# Patient Record
Sex: FEMALE | Race: WHITE
[De-identification: names, ages, dates, MRNs, and addresses within clinical notes are randomized per-mention and may not be internally consistent; named-entity substitution may affect disease eponyms.]

---

## 2018-02-04 ENCOUNTER — HOSPITAL ENCOUNTER (EMERGENCY)
Dept: HOSPITAL 45 - C.EDB | Age: 23
Discharge: HOME | End: 2018-02-04
Payer: COMMERCIAL

## 2018-02-04 VITALS
DIASTOLIC BLOOD PRESSURE: 67 MMHG | TEMPERATURE: 98.6 F | HEART RATE: 97 BPM | OXYGEN SATURATION: 97 % | SYSTOLIC BLOOD PRESSURE: 124 MMHG

## 2018-02-04 VITALS
BODY MASS INDEX: 34.1 KG/M2 | HEIGHT: 64.02 IN | WEIGHT: 199.74 LBS | HEIGHT: 64.02 IN | WEIGHT: 199.74 LBS | BODY MASS INDEX: 34.1 KG/M2

## 2018-02-04 DIAGNOSIS — Z79.3: ICD-10-CM

## 2018-02-04 DIAGNOSIS — J10.1: Primary | ICD-10-CM

## 2018-02-04 LAB
ALBUMIN SERPL-MCNC: 3.7 GM/DL (ref 3.4–5)
ALP SERPL-CCNC: 70 U/L (ref 45–117)
ALT SERPL-CCNC: 20 U/L (ref 12–78)
AST SERPL-CCNC: 15 U/L (ref 15–37)
BASOPHILS # BLD: 0.01 K/UL (ref 0–0.2)
BASOPHILS NFR BLD: 0.2 %
BUN SERPL-MCNC: 7 MG/DL (ref 7–18)
CALCIUM SERPL-MCNC: 8.7 MG/DL (ref 8.5–10.1)
CO2 SERPL-SCNC: 26 MMOL/L (ref 21–32)
CREAT SERPL-MCNC: 0.83 MG/DL (ref 0.6–1.2)
EOS ABS #: 0 K/UL (ref 0–0.5)
EOSINOPHIL NFR BLD AUTO: 223 K/UL (ref 130–400)
GLUCOSE SERPL-MCNC: 114 MG/DL (ref 70–99)
HCT VFR BLD CALC: 43.4 % (ref 37–47)
HGB BLD-MCNC: 14.5 G/DL (ref 12–16)
IG#: 0.01 K/UL (ref 0–0.02)
IMM GRANULOCYTES NFR BLD AUTO: 14 %
INFLUENZA B ANTIGEN: (no result)
LYMPHOCYTES # BLD: 0.75 K/UL (ref 1.2–3.4)
MCH RBC QN AUTO: 28.9 PG (ref 25–34)
MCHC RBC AUTO-ENTMCNC: 33.4 G/DL (ref 32–36)
MCV RBC AUTO: 86.6 FL (ref 80–100)
MONO ABS #: 0.73 K/UL (ref 0.11–0.59)
MONOCYTES NFR BLD: 13.6 %
NEUT ABS #: 3.85 K/UL (ref 1.4–6.5)
NEUTROPHILS # BLD AUTO: 0 %
NEUTROPHILS NFR BLD AUTO: 72 %
PMV BLD AUTO: 9.5 FL (ref 7.4–10.4)
POTASSIUM SERPL-SCNC: 3.4 MMOL/L (ref 3.5–5.1)
PROT SERPL-MCNC: 7.9 GM/DL (ref 6.4–8.2)
RED CELL DISTRIBUTION WIDTH CV: 13.1 % (ref 11.5–14.5)
RED CELL DISTRIBUTION WIDTH SD: 41.7 FL (ref 36.4–46.3)
SODIUM SERPL-SCNC: 136 MMOL/L (ref 136–145)
WBC # BLD AUTO: 5.35 K/UL (ref 4.8–10.8)

## 2018-02-04 NOTE — DIAGNOSTIC IMAGING REPORT
CHEST 2 VIEWS ROUTINE



CLINICAL HISTORY: 22 years-old Female presenting with cough, fever, eval PNA. 



TECHNIQUE: PA and lateral views of the chest were obtained.



COMPARISON: None.



FINDINGS:

Cardiomediastinal silhouette normal. Lungs and pleural spaces clear. Osseous

structures normal. Upper abdomen normal.



IMPRESSION:

1.  No acute cardiopulmonary disease.







Electronically signed by:  Cameron Leyva M.D.

2/4/2018 5:23 PM



Dictated Date/Time:  2/4/2018 5:22 PM

## 2018-02-04 NOTE — EMERGENCY ROOM VISIT NOTE
History


First contact with patient:  15:51


Chief Complaint:  FLU LIKE SX


Stated Complaint:  CHEST PAIN,COUGH,HA,COLD,FEVER,RUNNY NOSE,DIZZY





History of Present Illness


The patient is a 22 year old female who presents to the Emergency Room with 

complaints of cough for the past week, and development of worsening cough, 

chest tightness, fevers and chills, dizziness, and fatigue starting last night.

  She has been coughing up clear mucus, she denies hemoptysis.  She states her 

chest tightness is in the center of her chest, worse with coughing, better with 

rest, not worse with taking a deep breath or lying flat, 2/10.  She complains 

of generalized body aches and headaches as well.  She denies shortness of breath

, but states that she feels a little winded with activity, which is unusual for 

her.  She states she took Motrin last night, but has not taken any medications 

today for her symptoms.  She denies any neck pain or stiffness, vision changes, 

syncope, abdominal pain, back pain, nausea or vomiting, diarrhea, constipation, 

bloody or black stools, dysuria or urinary frequency, abnormal vaginal discharge

, or rash.  She states that her period started yesterday and these are regular, 

she denies concern for pregnancy.





Review of Systems


A complete 10 point review of systems was reviewed with the patient with 

pertinent positives and negatives as per history of present illness. All else 

were negative.





Past Medical/Surgical History


No significant past medical or surgical history.  She is up-to-date on 

immunizations.





Social History


Smoking Status:  Never Smoker


Alcohol Use:  none


Drug Use:  none


Marital Status:  single


Occupation Status:  [employer] student





Current/Historical Medications


Scheduled


Birth Control Pills (Birth Control Pills), 1 TAB PO DAILY





Allergies


No known allergies





Physical Exam


Vital Signs











  Date Time  Temp Pulse Resp B/P (MAP) Pulse Ox O2 Delivery O2 Flow Rate FiO2


 


2/4/18 19:10 37.0 97 18 124/67 97   


 


2/4/18 18:10 37.3 98 15 110/61 97 Room Air  


 


2/4/18 17:35  100 15  97   


 


2/4/18 17:05  107 20  97   


 


2/4/18 16:48  102      


 


2/4/18 16:39    118/52    


 


2/4/18 16:38  95 18 118/52 97 Room Air  


 


2/4/18 15:09 38.9 119 18 112/70 97 Room Air  











Physical Exam


CONSTITUTIONAL: Pleasant and cooperative.  No acute distress, but does appear 

uncomfortable.  Mildly dehydrated.


HEENT: Normocephalic, atraumatic. Pupils equal, round and reactive to light, 

EOMI. TMs normal. Pharynx normal. []


NECK: Supple, full active range of motion without discomfort.


RESPIRATORY: Clear to auscultation bilaterally with no wheezing, crackles, 

rhonchi or stridor. Equal expansion bilaterally.


CARDIOVASCULAR: Regular rate and rhythm with no murmurs, rubs or gallops. 

Normal peripheral perfusion. No edema.


GASTROINTESTINAL: Soft, nontender, nondistended. No palpable masses or HSM. 

Bowel sounds present in all quadrants. 


MUSCULOSKELETAL: Full range of motion of all joints without discomfort.


INTEGUMENTARY: No rash or other significant dermatologic conditions noted.


NEUROLOGIC: Alert and oriented X 4 with normal affect.  Cranial nerves II-XII 

grossly intact. No focal neurologic deficits noted. []





Medical Decision & Procedures


ER Provider


Diagnostic Interpretation:


CHEST 2 VIEWS ROUTINE





CLINICAL HISTORY: 22 years-old Female presenting with cough, fever, eval PNA. 





TECHNIQUE: PA and lateral views of the chest were obtained.





COMPARISON: None.





FINDINGS:


Cardiomediastinal silhouette normal. Lungs and pleural spaces clear. Osseous


structures normal. Upper abdomen normal.





IMPRESSION:


1.  No acute cardiopulmonary disease.





Laboratory Results


2/4/18 16:21








Red Blood Count 5.01, Mean Corpuscular Volume 86.6, Mean Corpuscular Hemoglobin 

28.9, Mean Corpuscular Hemoglobin Concent 33.4, Mean Platelet Volume 9.5, 

Neutrophils (%) (Auto) 72.0, Lymphocytes (%) (Auto) 14.0, Monocytes (%) (Auto) 

13.6, Eosinophils (%) (Auto) 0.0, Basophils (%) (Auto) 0.2, Neutrophils # (Auto

) 3.85, Lymphocytes # (Auto) 0.75, Monocytes # (Auto) 0.73, Eosinophils # (Auto

) 0.00, Basophils # (Auto) 0.01





2/4/18 16:21

















Test


  2/4/18


16:21 2/4/18


16:40


 


White Blood Count


  5.35 K/uL


(4.8-10.8) 


 


 


Red Blood Count


  5.01 M/uL


(4.2-5.4) 


 


 


Hemoglobin


  14.5 g/dL


(12.0-16.0) 


 


 


Hematocrit 43.4 % (37-47)  


 


Mean Corpuscular Volume


  86.6 fL


() 


 


 


Mean Corpuscular Hemoglobin


  28.9 pg


(25-34) 


 


 


Mean Corpuscular Hemoglobin


Concent 33.4 g/dl


(32-36) 


 


 


Platelet Count


  223 K/uL


(130-400) 


 


 


Mean Platelet Volume


  9.5 fL


(7.4-10.4) 


 


 


Neutrophils (%) (Auto) 72.0 %  


 


Lymphocytes (%) (Auto) 14.0 %  


 


Monocytes (%) (Auto) 13.6 %  


 


Eosinophils (%) (Auto) 0.0 %  


 


Basophils (%) (Auto) 0.2 %  


 


Neutrophils # (Auto)


  3.85 K/uL


(1.4-6.5) 


 


 


Lymphocytes # (Auto)


  0.75 K/uL


(1.2-3.4) 


 


 


Monocytes # (Auto)


  0.73 K/uL


(0.11-0.59) 


 


 


Eosinophils # (Auto)


  0.00 K/uL


(0-0.5) 


 


 


Basophils # (Auto)


  0.01 K/uL


(0-0.2) 


 


 


RDW Standard Deviation


  41.7 fL


(36.4-46.3) 


 


 


RDW Coefficient of Variation


  13.1 %


(11.5-14.5) 


 


 


Immature Granulocyte % (Auto) 0.2 %  


 


Immature Granulocyte # (Auto)


  0.01 K/uL


(0.00-0.02) 


 


 


Anion Gap


  9.0 mmol/L


(3-11) 


 


 


Est Creatinine Clear Calc


Drug Dose 117.9 ml/min 


  


 


 


Estimated GFR (


American) 117.7 


  


 


 


Estimated GFR (Non-


American 101.5 


  


 


 


BUN/Creatinine Ratio 8.3 (10-20)  


 


Calcium Level


  8.7 mg/dl


(8.5-10.1) 


 


 


Total Bilirubin


  0.3 mg/dl


(0.2-1) 


 


 


Aspartate Amino Transf


(AST/SGOT) 15 U/L (15-37) 


  


 


 


Alanine Aminotransferase


(ALT/SGPT) 20 U/L (12-78) 


  


 


 


Alkaline Phosphatase


  70 U/L


() 


 


 


Total Protein


  7.9 gm/dl


(6.4-8.2) 


 


 


Albumin


  3.7 gm/dl


(3.4-5.0) 


 


 


Globulin


  4.2 gm/dl


(2.5-4.0) 


 


 


Albumin/Globulin Ratio 0.9 (0.9-2)  


 


Influenza Type A Antigen


  Neg for Influ


A (NEG) 


 


 


Influenza Type B Antigen


  POS for Influ


B (NEG) 


 


 


Urine Color  DK YELLOW 


 


Urine Appearance  CLEAR (CLEAR) 


 


Urine pH  5.0 (4.5-7.5) 


 


Urine Specific Gravity


  


  1.027


(1.000-1.030)


 


Urine Protein  NEG (NEG) 


 


Urine Glucose (UA)  NEG (NEG) 


 


Urine Ketones  TRACE (NEG) 


 


Urine Occult Blood  2+ (NEG) 


 


Urine Nitrite  NEG (NEG) 


 


Urine Bilirubin  NEG (NEG) 


 


Urine Urobilinogen  NEG (NEG) 


 


Urine Leukocyte Esterase  NEG (NEG) 


 


Urine WBC (Auto)


  


  5-10 /hpf


(0-5)


 


Urine RBC (Auto)  0-4 /hpf (0-4) 


 


Urine Hyaline Casts (Auto)  1-5 /lpf (0-5) 


 


Urine Epithelial Cells (Auto)  >30 /lpf (0-5) 


 


Urine Bacteria (Auto)  NEG (NEG) 


 


Urine Pregnancy Test  NEG (NEG) 











Medications Administered











 Medications


  (Trade)  Dose


 Ordered  Sig/Corey


 Route  Start Time


 Stop Time Status Last Admin


Dose Admin


 


 Acetaminophen


  (Tylenol Tab)  1,000 mg  NOW  STAT


 PO  2/4/18 15:16


 2/4/18 15:18 DC 2/4/18 15:55


1,000 MG


 


 Sodium Chloride  1,000 ml @ 


 999 mls/hr  Q1H1M STAT


 IV  2/4/18 16:03


 2/4/18 17:03 DC 2/4/18 16:26


999 MLS/HR


 


 Albuterol/


 Ipratropium


  (Duoneb)  3 ml  NOW  STAT


 INH  2/4/18 16:11


 2/4/18 16:12 DC 2/4/18 16:26


3 ML


 


 Ketorolac


 Tromethamine


  (Toradol Inj)  15 mg  NOW  STAT


 IV  2/4/18 17:29


 2/4/18 17:30 DC 2/4/18 18:09


15 MG











ECG


Indication:  chest pain, tachycardia


Rate (beats per minute):  88


Rhythm:  normal sinus


Findings:  no acute ischemic change, no ectopy


Comparison ECG Date:  no prior available





Medical Decision


CC: Patient presenting with complaint of flulike symptoms 





Interpretation of Labs: No leukocytosis, no anemia, no significant electrolyte 

abnormality, normal renal function, normal liver enzymes.  Influenza positive 

for type B.  UA negative for infection.  Urine pregnancy negative.





Differential Diagnosis: Includes, but not limited to viral URI, bronchitis, 

pneumonia, influenza, dehydration, among others.





Medication Reconciliation: I attest that I have personally reviewed the patient'

s current medication list.





Initial vital signs review: I reviewed the patient's vital signs and interpret 

them as follows: T: Febrile;  BP: Normotensive;  HR: Tachycardic;  RR: Within 

normal limits;  Pulse Ox: Within normal limits on room air.


Blood pressure screening: The patient was found to have normal blood pressure 

on screening and does not require follow-up for repeat blood pressure check.





Summary: 


Patient was evaluated at bedside, history and physical exam performed.


Patient is alert and oriented, no acute distress, resting, in the stretcher.


Patient is noted to be tachycardic and febrile, and appears to feel generally 

unwell.  She does appear mildly dehydrated.


Orders were placed at bedside for labs, UA and urine pregnancy, IV fluids for 

hydration, Tylenol for fever, EKG, chest x-ray to evaluate for pneumonia.


Patient discussed with Dr. Costa, who agrees with my assessment and plan.


Labs reviewed as above, positive for influenza type B.  Labs otherwise 

unremarkable.


EKG shows normal sinus rhythm with no acute ischemic changes by my 

interpretation.


Chest x-ray is unremarkable.


Patient reassessed multiple times throughout ED stay, she appears much improved

, tachycardia resolving, and she has defervesced appropriately after Tylenol.


She is tolerating oral fluids well without complications.  She states she is 

feeling much better and is ready to go home.


Patient was updated on all results and plan for discharge, she was encouraged 

to follow closely with her primary care provider or [facility].


She was also given strict return precautions should her symptoms worsen, she 

verbalized understanding.


The patient was discharged home in stable condition and ambulatory.





Impression





 Primary Impression:  


 Influenza B





Departure Information


Dispostion


Home / Self-Care





Condition


GOOD





Patient Instructions


ED Flu, [facility]





Additional Instructions





You have been treated in the Emergency Department today for Dehydration.  Test 

results today are POSITIVE for influenza type B. This is most likely the cause 

of all of your symptoms.





Influenza is a type of virus that should run its course and symptoms should be 

improved after 7-10 days, but may last up to 14 days.





For fevers and body aches/headaches, you may take the following over-the-

counter medications:





- Extra strength Tylenol (500 mg)  1-2 tablets every 6-8 hours as needed.  Do 

not take more than 6 tablets (3000 mg) in 24 hours.





- Ibuprofen (200 mg)  3 tablets every 6-8 hours as needed.  Do not take more 

than 2400 mg in 24 hours.





- For best results, you may alternate between the Tylenol and the ibuprofen 

every 3-4 hours for severe body aches and fevers. 





It is ESSENTIAL that you maintain adequate hydration with oral fluids! Some 

suggestions include:


-   Water is the IDEAL replacement for lost fluids. You should initially sip at 

the water to help facilitate increased intestinal absorption rate and to 

decrease the possibility of nausea/vomiting.


-   Carbohydrate/Electrolyte-Containing Drinks (i.e. Gatorade, Powerade, 

Pedialyte). All of these are good choices, but it is important to remember that 

all of these drinks contain a high concentration of sugar.


-   Popsicles, ice chips, and fruit juices are all other options.


-   My FAVORITE dehydration remedy is to mix a 1:1 solution of bottled Gatorade 

with bottled water. This dilution allows for a palatable flavor with added 

benefit of a reduction in the amount of sugar consumption.





As with all Emergency Department visits, you should follow-up with your Primary 

Care Provider in 2-3 days for reevaluation.





[facility] morning for guidance and assistance 

regarding any missed classes this week due to your illness.





Please return to the emergency department for any worsening symptoms, including 

difficulty breathing, chest pain, coughing up blood, severe dizziness or 

passing out, confusion, severe headache, or any other concerns.





School Instructions


Return To School:  5 days

## 2018-03-01 ENCOUNTER — HOSPITAL ENCOUNTER (OUTPATIENT)
Dept: HOSPITAL 45 - C.LABSPEC | Age: 23
Discharge: HOME | End: 2018-03-01
Attending: PHYSICIAN ASSISTANT
Payer: COMMERCIAL

## 2018-03-01 DIAGNOSIS — Z20.2: Primary | ICD-10-CM
